# Patient Record
Sex: MALE | Race: WHITE | NOT HISPANIC OR LATINO | Employment: FULL TIME | URBAN - NONMETROPOLITAN AREA
[De-identification: names, ages, dates, MRNs, and addresses within clinical notes are randomized per-mention and may not be internally consistent; named-entity substitution may affect disease eponyms.]

---

## 2024-06-12 ENCOUNTER — APPOINTMENT (OUTPATIENT)
Dept: PRIMARY CARE | Facility: CLINIC | Age: 44
End: 2024-06-12

## 2024-07-02 ENCOUNTER — LAB (OUTPATIENT)
Dept: LAB | Facility: LAB | Age: 44
End: 2024-07-02
Payer: COMMERCIAL

## 2024-07-02 ENCOUNTER — APPOINTMENT (OUTPATIENT)
Dept: PRIMARY CARE | Facility: CLINIC | Age: 44
End: 2024-07-02
Payer: COMMERCIAL

## 2024-07-02 VITALS
BODY MASS INDEX: 28.06 KG/M2 | DIASTOLIC BLOOD PRESSURE: 63 MMHG | RESPIRATION RATE: 16 BRPM | SYSTOLIC BLOOD PRESSURE: 100 MMHG | OXYGEN SATURATION: 98 % | HEIGHT: 72 IN | TEMPERATURE: 98.1 F | HEART RATE: 65 BPM | WEIGHT: 207.2 LBS

## 2024-07-02 DIAGNOSIS — E66.3 OVERWEIGHT WITH BODY MASS INDEX (BMI) OF 28 TO 28.9 IN ADULT: ICD-10-CM

## 2024-07-02 DIAGNOSIS — Z00.00 ENCOUNTER FOR WELLNESS EXAMINATION IN ADULT: ICD-10-CM

## 2024-07-02 DIAGNOSIS — Z00.00 ENCOUNTER FOR WELLNESS EXAMINATION IN ADULT: Primary | ICD-10-CM

## 2024-07-02 PROCEDURE — 85027 COMPLETE CBC AUTOMATED: CPT

## 2024-07-02 PROCEDURE — 36415 COLL VENOUS BLD VENIPUNCTURE: CPT

## 2024-07-02 PROCEDURE — 80053 COMPREHEN METABOLIC PANEL: CPT

## 2024-07-02 PROCEDURE — 3008F BODY MASS INDEX DOCD: CPT | Performed by: FAMILY MEDICINE

## 2024-07-02 PROCEDURE — 1036F TOBACCO NON-USER: CPT | Performed by: FAMILY MEDICINE

## 2024-07-02 PROCEDURE — 80061 LIPID PANEL: CPT

## 2024-07-02 PROCEDURE — 99396 PREV VISIT EST AGE 40-64: CPT | Performed by: FAMILY MEDICINE

## 2024-07-02 PROCEDURE — 83036 HEMOGLOBIN GLYCOSYLATED A1C: CPT

## 2024-07-02 RX ORDER — FLUTICASONE PROPIONATE 50 MCG
2 SPRAY, SUSPENSION (ML) NASAL DAILY
COMMUNITY
Start: 2020-10-22

## 2024-07-02 ASSESSMENT — PROMIS GLOBAL HEALTH SCALE
CARRYOUT_PHYSICAL_ACTIVITIES: MODERATELY
EMOTIONAL_PROBLEMS: RARELY
RATE_PHYSICAL_HEALTH: FAIR
RATE_AVERAGE_FATIGUE: MODERATE
RATE_SOCIAL_SATISFACTION: GOOD
RATE_GENERAL_HEALTH: GOOD
RATE_QUALITY_OF_LIFE: GOOD
RATE_AVERAGE_PAIN: 1
RATE_MENTAL_HEALTH: GOOD
CARRYOUT_SOCIAL_ACTIVITIES: VERY GOOD

## 2024-07-02 ASSESSMENT — PATIENT HEALTH QUESTIONNAIRE - PHQ9
2. FEELING DOWN, DEPRESSED OR HOPELESS: NOT AT ALL
SUM OF ALL RESPONSES TO PHQ9 QUESTIONS 1 AND 2: 0
1. LITTLE INTEREST OR PLEASURE IN DOING THINGS: NOT AT ALL

## 2024-07-02 NOTE — PROGRESS NOTES
A (CATHETER US 5FR 20MM 150CM 20MHZ SHORT TPR TRNDCR) catheter was inserted. Subjective   Patient ID: Ernesto Boudreaux is a 44 y.o. male who presents for a wellness examination.     The patient was asked for permission to have a student present during their visit today. The patient agreed to both the presence of the student and his/her active participation in his/her exam today.     HPI   Diet: lactose free, avoids red meat (no gallbladder)  Supplements/vitamins: none  Alcohol use: 3-4 drinks/week  Caffeine intake: coffee, 3-4 cups coffee/day  Exercise: jogging, weights 3 days/week but not consistent  Sleep: no issues   Last dental appointment: due  Last eye appointment: LASIK in 2009, vision has changed, intermittently better/worse at times and concerned about diabetes. Worse the last 3-4 mo, vision is blurrier in regard to distance vision. Works at a computer for work. Denies other neurologic symptoms.   Anxiety/Depression: denies   Cscope: 2021 by charley Brunson 5 years  Fmhx colon cancer: N  CT cardiac score: none found   Tobacco use/Lung cancer screening: N  Recreational drug use: N  Immunizations: up to date     Review of Systems   All other systems reviewed and are negative.      Objective   /63 (BP Location: Left arm, Patient Position: Sitting, BP Cuff Size: Large adult)   Pulse 65   Temp 36.7 °C (98.1 °F) (Temporal)   Resp 16   Ht 1.829 m (6')   Wt 94 kg (207 lb 3.2 oz)   SpO2 98%   BMI 28.10 kg/m²     Physical Exam  Constitutional: Well developed, well nourished, alert and in no acute distress.  Head and Face: Normocephalic, atraumatic.  Eyes: Normal external exam. Pupils equally round and reactive to light with normal accommodation and extraocular movements intact.   ENT: External inspection of ears normal, tympanic membranes visualized and normal. Nasal mucosa, septum, and turbinates normal. Oral mucosa moist, oropharynx clear.   Neck: Supple, no lymphadenopathy or masses. Thyroid not enlarged, no palpable nodules.   Cardiovascular: Regular rate and rhythm,  normal S1 and S2, no murmurs, gallops, or rubs. Radial pulses normal. No peripheral edema. No carotid bruits.   Pulmonary: No respiratory distress, lungs clear to auscultation bilaterally. No wheezes, rhonchi, rales.  Abdomen: Soft, nontender, nondistended, normal bowel sounds. No masses palpated. No hernias.  Musculoskeletal: Gait normal. Muscle strength/tone normal. Normal range of motion of all extremities.   Skin: Warm, well perfused, normal skin turgor and color, no lesions or rashes noted.   Neurologic: Cranial nerves II-XII grossly intact. Deep tendon reflexes were 2+ and symmetric. Sensation normal bilaterally.   Psychiatric: Mood calm and affect normal.    Assessment/Plan   Recommendations for men annual wellness exam:   Colonoscopy at age 50 or earlier if positive family history of colon cancer   Discuss prostate cancer screening between ages 55-69 or sooner if family history of prostate cancer   One time screen for abdominal aortic aneurysm for men ages 65-75 who have ever smoked  Screening for lung cancer with low-dose CT in all adults age 55-77 who have a 30 pack-year smoking history and currently smoke or have quit within the past 15 years  Follow a healthy diet (Dash diet, Mediterranean diet)  Exercise 150 min/wk  Maintain healthy weight (BMI < 25)  Do not smoke   Alcohol in moderation (up to 2 drinks/day)   Get enough sleep (7-8 hours/night)  Make sure immunizations are up to date (influenza, pneumococcal, Tdap, shingles if age > 50)  Visit dentist twice yearly      Fasting labs ordered    Follow up in 1 year for CPE

## 2024-07-03 LAB
ALBUMIN SERPL BCP-MCNC: 4.9 G/DL (ref 3.4–5)
ALP SERPL-CCNC: 63 U/L (ref 33–120)
ALT SERPL W P-5'-P-CCNC: 21 U/L (ref 10–52)
ANION GAP SERPL CALC-SCNC: 12 MMOL/L (ref 10–20)
AST SERPL W P-5'-P-CCNC: 21 U/L (ref 9–39)
BILIRUB SERPL-MCNC: 0.8 MG/DL (ref 0–1.2)
BUN SERPL-MCNC: 11 MG/DL (ref 6–23)
CALCIUM SERPL-MCNC: 10.1 MG/DL (ref 8.6–10.6)
CHLORIDE SERPL-SCNC: 101 MMOL/L (ref 98–107)
CHOLEST SERPL-MCNC: 151 MG/DL (ref 0–199)
CHOLESTEROL/HDL RATIO: 3.4
CO2 SERPL-SCNC: 31 MMOL/L (ref 21–32)
CREAT SERPL-MCNC: 0.84 MG/DL (ref 0.5–1.3)
EGFRCR SERPLBLD CKD-EPI 2021: >90 ML/MIN/1.73M*2
ERYTHROCYTE [DISTWIDTH] IN BLOOD BY AUTOMATED COUNT: 12.2 % (ref 11.5–14.5)
EST. AVERAGE GLUCOSE BLD GHB EST-MCNC: 105 MG/DL
GLUCOSE SERPL-MCNC: 93 MG/DL (ref 74–99)
HBA1C MFR BLD: 5.3 %
HCT VFR BLD AUTO: 42.9 % (ref 41–52)
HDLC SERPL-MCNC: 44.1 MG/DL
HGB BLD-MCNC: 14.4 G/DL (ref 13.5–17.5)
LDLC SERPL CALC-MCNC: 95 MG/DL
MCH RBC QN AUTO: 31.2 PG (ref 26–34)
MCHC RBC AUTO-ENTMCNC: 33.6 G/DL (ref 32–36)
MCV RBC AUTO: 93 FL (ref 80–100)
NON HDL CHOLESTEROL: 107 MG/DL (ref 0–149)
NRBC BLD-RTO: 0 /100 WBCS (ref 0–0)
PLATELET # BLD AUTO: 237 X10*3/UL (ref 150–450)
POTASSIUM SERPL-SCNC: 4.6 MMOL/L (ref 3.5–5.3)
PROT SERPL-MCNC: 7.5 G/DL (ref 6.4–8.2)
RBC # BLD AUTO: 4.61 X10*6/UL (ref 4.5–5.9)
SODIUM SERPL-SCNC: 139 MMOL/L (ref 136–145)
TRIGL SERPL-MCNC: 60 MG/DL (ref 0–149)
VLDL: 12 MG/DL (ref 0–40)
WBC # BLD AUTO: 7 X10*3/UL (ref 4.4–11.3)

## 2024-07-03 NOTE — RESULT ENCOUNTER NOTE
Normal A1c, no diabetes    Blood counts are normal including white blood cells and platelets and there is no anemia.    Kidney function, glucose, electrolytes, and liver function are normal.    Normal cholesterol levels

## 2025-07-11 ENCOUNTER — APPOINTMENT (OUTPATIENT)
Dept: PRIMARY CARE | Facility: CLINIC | Age: 45
End: 2025-07-11
Payer: COMMERCIAL

## 2025-07-11 VITALS
DIASTOLIC BLOOD PRESSURE: 72 MMHG | RESPIRATION RATE: 12 BRPM | TEMPERATURE: 98 F | SYSTOLIC BLOOD PRESSURE: 120 MMHG | HEIGHT: 72 IN | HEART RATE: 84 BPM | OXYGEN SATURATION: 97 % | WEIGHT: 214.3 LBS | BODY MASS INDEX: 29.02 KG/M2

## 2025-07-11 DIAGNOSIS — E66.3 OVERWEIGHT WITH BODY MASS INDEX (BMI) OF 29 TO 29.9 IN ADULT: ICD-10-CM

## 2025-07-11 DIAGNOSIS — Z00.00 ENCOUNTER FOR WELLNESS EXAMINATION IN ADULT: Primary | ICD-10-CM

## 2025-07-11 DIAGNOSIS — J30.89 ALLERGIC RHINITIS DUE TO OTHER ALLERGIC TRIGGER, UNSPECIFIED SEASONALITY: ICD-10-CM

## 2025-07-11 PROCEDURE — 99396 PREV VISIT EST AGE 40-64: CPT | Performed by: FAMILY MEDICINE

## 2025-07-11 PROCEDURE — 3008F BODY MASS INDEX DOCD: CPT | Performed by: FAMILY MEDICINE

## 2025-07-11 PROCEDURE — 1036F TOBACCO NON-USER: CPT | Performed by: FAMILY MEDICINE

## 2025-07-11 RX ORDER — IPRATROPIUM BROMIDE 21 UG/1
2 SPRAY, METERED NASAL EVERY 12 HOURS
Qty: 30 ML | Refills: 0 | Status: SHIPPED | OUTPATIENT
Start: 2025-07-11 | End: 2025-08-10

## 2025-07-11 ASSESSMENT — PATIENT HEALTH QUESTIONNAIRE - PHQ9
2. FEELING DOWN, DEPRESSED OR HOPELESS: NOT AT ALL
1. LITTLE INTEREST OR PLEASURE IN DOING THINGS: NOT AT ALL
SUM OF ALL RESPONSES TO PHQ9 QUESTIONS 1 AND 2: 0

## 2025-07-11 NOTE — PROGRESS NOTES
"Subjective   Patient ID: Phillip Boudreaux is a 45 y.o. male who presents for Annual Exam (The patient was made aware that AI (artificial intelligence) technology will be utilized during today's visit. The patient expressed understanding and verbally accepts the use of AI technology.  /).    HPI   Immunizations: up to date   Cscope: 5/2021, repeat in 5 years   CT cardiac score: none found     History of Present Illness  Ernesto Boudreaux \"Phillip\" is a 45 year old male who presents for a routine follow-up visit.    He acknowledges that his diet is not optimal, leading to weight gain, and he is not currently engaging in exercise, which he understands is important for maintaining joint strength.    He experiences occasional anxiety, which can make it difficult for him to fall asleep or stay asleep, although he generally sleeps well most nights. To aid with sleep, he occasionally takes Tylenol PM or Advil PM, which he finds effective.    He uses Flonase daily for allergies and occasionally takes Sudafed on particularly bad days. He has tried Claritin and Allegra in the past but found them ineffective for his congestion and drainage issues. No new allergies are reported.    He has not seen a dermatologist in the past 18 months but has no current skin concerns. He is up to date with the dentist and his vision exam.     There are no significant changes in family history, although his father passed away this year from Parkinson's disease, which had an early onset when he was in high school. His mother was also diagnosed with Parkinson's. PHQ-9: 0    Review of Systems   All other systems reviewed and are negative.    Objective   /72 (BP Location: Right arm, Patient Position: Sitting, BP Cuff Size: Large adult)   Pulse 84   Temp 36.7 °C (98 °F) (Temporal)   Resp 12   Ht 1.829 m (6')   Wt 97.2 kg (214 lb 4.8 oz)   SpO2 97%   BMI 29.06 kg/m²     Physical Exam  Constitutional: Well developed, well nourished, alert and in no " acute distress.  Head and Face: Normocephalic, atraumatic.  Eyes: Normal external exam. Pupils equally round and reactive to light with normal accommodation and extraocular movements intact.   ENT: External inspection of ears normal, tympanic membranes visualized and normal. Nasal mucosa, septum, and turbinates normal. Oral mucosa moist, oropharynx clear.   Neck: Supple, no lymphadenopathy or masses. Thyroid not enlarged, no palpable nodules.   Cardiovascular: Regular rate and rhythm, normal S1 and S2, no murmurs, gallops, or rubs. Radial pulses normal. No peripheral edema.  Pulmonary: No respiratory distress, lungs clear to auscultation bilaterally. No wheezes, rhonchi, rales.  Abdomen: Soft, nontender, nondistended, normal bowel sounds. No masses palpated. No hernias.  Musculoskeletal: Gait normal. Muscle strength/tone normal. Normal range of motion of all extremities.   Skin: Warm, well perfused, normal skin turgor and color, no lesions or rashes noted.   Neurologic: Cranial nerves II-XII grossly intact. Deep tendon reflexes were 2+ and symmetric. Sensation normal bilaterally.   Psychiatric: Mood calm and affect normal.    Assessment/Plan   Recommendations for men annual wellness exam:   Colonoscopy at age 50 or earlier if positive family history of colon cancer   Discuss prostate cancer screening between ages 55-69 or sooner if family history of prostate cancer   One time screen for abdominal aortic aneurysm for men ages 65-75 who have ever smoked  Screening for lung cancer with low-dose CT in all adults age 55-77 who have a 30 pack-year smoking history and currently smoke or have quit within the past 15 years  Follow a healthy diet (Dash diet, Mediterranean diet)  Exercise 150 min/wk  Maintain healthy weight (BMI < 25)  Do not smoke   Alcohol in moderation (up to 2 drinks/day)   Get enough sleep (7-8 hours/night)  Make sure immunizations are up to date (influenza, pneumococcal, Tdap, shingles if age >  50)  Visit dentist twice yearly       STOP Flonase and transition to atrovent nasal spray, as long term use of flonase can thin the tissue within the nose.     Fasting labs ordered    Follow up in 1 year for CPE

## 2026-07-10 ENCOUNTER — APPOINTMENT (OUTPATIENT)
Dept: PRIMARY CARE | Facility: CLINIC | Age: 46
End: 2026-07-10
Payer: COMMERCIAL